# Patient Record
Sex: MALE | Race: OTHER
[De-identification: names, ages, dates, MRNs, and addresses within clinical notes are randomized per-mention and may not be internally consistent; named-entity substitution may affect disease eponyms.]

---

## 2020-01-06 ENCOUNTER — HOSPITAL ENCOUNTER (EMERGENCY)
Dept: HOSPITAL 11 - JP.ED | Age: 30
Discharge: HOME | End: 2020-01-06
Payer: SELF-PAY

## 2020-01-06 DIAGNOSIS — N13.2: Primary | ICD-10-CM

## 2020-01-06 DIAGNOSIS — F17.210: ICD-10-CM

## 2020-01-06 PROCEDURE — 80053 COMPREHEN METABOLIC PANEL: CPT

## 2020-01-06 PROCEDURE — 96375 TX/PRO/DX INJ NEW DRUG ADDON: CPT

## 2020-01-06 PROCEDURE — 36415 COLL VENOUS BLD VENIPUNCTURE: CPT

## 2020-01-06 PROCEDURE — 74177 CT ABD & PELVIS W/CONTRAST: CPT

## 2020-01-06 PROCEDURE — 83690 ASSAY OF LIPASE: CPT

## 2020-01-06 PROCEDURE — 83605 ASSAY OF LACTIC ACID: CPT

## 2020-01-06 PROCEDURE — 96374 THER/PROPH/DIAG INJ IV PUSH: CPT

## 2020-01-06 PROCEDURE — 96361 HYDRATE IV INFUSION ADD-ON: CPT

## 2020-01-06 PROCEDURE — 99284 EMERGENCY DEPT VISIT MOD MDM: CPT

## 2020-01-06 PROCEDURE — 85025 COMPLETE CBC W/AUTO DIFF WBC: CPT

## 2020-01-06 NOTE — EDM.PDOC
ED HPI GENERAL MEDICAL PROBLEM





- General


Chief Complaint: Abdominal Pain


Stated Complaint: RIGHT SIDE PAIN


Time Seen by Provider: 01/06/20 15:28


Source of Information: Reports: Patient, Family, RN Notes Reviewed, Other (

)


History Limitations: Reports: Language Barrier





- History of Present Illness


INITIAL COMMENTS - FREE TEXT/NARRATIVE: 





29-year-old gentleman presents emergency department a complaint of abdominal 

pain.  He is [place]-speaking only therefore we use the interpretive services.  

States the pain started today after urination it is predominantly in the right 

lower quadrant the pain is constant he does feel nauseated he is not passing 

gas denies any fevers shortness of breath or chest pain he does admit to 

abdominal surgery of a hernia repair on that side a couple years ago.





- Related Data


 Allergies











Allergy/AdvReac Type Severity Reaction Status Date / Time


 


No Known Allergies Allergy   Verified 01/06/20 15:08











Home Meds: 


 Home Meds





NK [No Known Home Meds]  01/06/20 [History]











Past Medical History





- Past Surgical History


GI Surgical History: Reports: Hernia Repair/Other





Social & Family History





- Tobacco Use


Smoking Status *Q: Light Tobacco Smoker


Years of Tobacco use: 5


Packs/Tins Daily: 0.2





- Recreational Drug Use


Recreational Drug Use: No





ED ROS GENERAL





- Review of Systems


Review Of Systems: See Below


Constitutional: Reports: No Symptoms


HEENT: Reports: No Symptoms


Respiratory: Reports: No Symptoms


Cardiovascular: Reports: No Symptoms


GI/Abdominal: Reports: Abdominal Pain, Nausea.  Denies: Flatus, Vomiting


: Reports: No Symptoms


Musculoskeletal: Reports: No Symptoms


Skin: Reports: No Symptoms


Neurological: Reports: No Symptoms





ED EXAM, GI/ABD





- Physical Exam


Exam: See Below


Exam Limited By: Language Barrier


General Appearance: Alert, WD/WN, No Apparent Distress


Head: Atraumatic, Normocephalic


Neck: Normal Inspection, Supple, Non-Tender, Full Range of Motion


Respiratory/Chest: No Respiratory Distress, Lungs Clear, Normal Breath Sounds, 

No Accessory Muscle Use, Chest Non-Tender


Cardiovascular: Regular Rate, Rhythm, No Murmur


GI/Abdominal Exam: Soft, Tender


Extremities: Normal Inspection, No Pedal Edema





Course





- Vital Signs


Last Recorded V/S: 


 Last Vital Signs











Temp  96.6 F   01/06/20 15:13


 


Pulse  86   01/06/20 15:13


 


Resp  18   01/06/20 15:13


 


BP  166/88 H  01/06/20 15:13


 


Pulse Ox  99   01/06/20 15:13














- Orders/Labs/Meds


Orders: 


 Active Orders 24 hr











 Category Date Time Status


 


 Peripheral IV Care [RC] .AS DIRECTED Care  01/06/20 15:29 Active


 


 UA W/MICROSCOPIC [URIN] Urgent Lab  01/06/20 15:29 Ordered


 


 Lactated Ringers [Ringers, Lactated] 1,000 ml Med  01/06/20 15:30 Active





 IV ASDIRECTED   


 


 Sodium Chloride 0.9% [Normal Saline] 85 ml Med  01/06/20 16:00 Active





 IV ASDIRECTED   


 


 Sodium Chloride 0.9% [Saline Flush] Med  01/06/20 15:29 Active





 10 ml FLUSH ASDIRECTED PRN   


 


 Peripheral IV Insertion Adult [OM.PC] Urgent Oth  01/06/20 15:29 Ordered








 Medication Orders





Lactated Ringer's (Ringers, Lactated)  1,000 mls @ 999 mls/hr IV ASDIRECTED ALEXI


   Last Admin: 01/06/20 15:48  Dose: 999 mls/hr


Sodium Chloride (Normal Saline)  85 mls @ 0 mls/hr IV ASDIRECTED ALEXI


   Last Admin: 01/06/20 16:12  Dose: 100 mls/hr


Sodium Chloride (Saline Flush)  10 ml FLUSH ASDIRECTED PRN


   PRN Reason: Keep Vein Open


   Last Admin: 01/06/20 15:41  Dose: 10 ml








Labs: 


 Laboratory Tests











  01/06/20 01/06/20 01/06/20 Range/Units





  15:35 15:35 15:35 


 


WBC  11.1 H    (4.5-11.0)  K/uL


 


RBC  5.34    (4.30-5.90)  M/uL


 


Hgb  14.2    (12.0-15.0)  g/dL


 


Hct  45.4    (40.0-54.0)  %


 


MCV  85    (80-98)  fL


 


MCH  27    (27-31)  pg


 


MCHC  31 L    (32-36)  %


 


Plt Count  302    (150-400)  K/uL


 


Neut % (Auto)  59    (36-66)  %


 


Lymph % (Auto)  29    (24-44)  %


 


Mono % (Auto)  7 H    (2-6)  %


 


Eos % (Auto)  4    (2-4)  %


 


Baso % (Auto)  1    (0-1)  %


 


Sodium   140   (140-148)  mmol/L


 


Potassium   3.9   (3.6-5.2)  mmol/L


 


Chloride   105   (100-108)  mmol/L


 


Carbon Dioxide   28   (21-32)  mmol/L


 


Anion Gap   6.9   (5.0-14.0)  mmol/L


 


BUN   10   (7-18)  mg/dL


 


Creatinine   1.0   (0.8-1.3)  mg/dL


 


Est Cr Clr Drug Dosing   111.53   mL/min


 


Estimated GFR (MDRD)   > 60   (>60)  


 


Glucose   106   ()  mg/dL


 


Lactic Acid    1.2  (0.4-2.0)  mmol/L


 


Calcium   8.8   (8.5-10.1)  mg/dL


 


Total Bilirubin   0.3   (0.2-1.0)  mg/dL


 


AST   27   (15-37)  U/L


 


ALT   64   (12-78)  U/L


 


Alkaline Phosphatase   92   ()  U/L


 


Total Protein   7.8   (6.4-8.2)  g/dL


 


Albumin   4.2   (3.4-5.0)  g/dL


 


Globulin   3.6 H   (2.3-3.5)  g/dL


 


Albumin/Globulin Ratio   1.2   (1.2-2.2)  


 


Lipase   67 L   ()  U/L











Meds: 


Medications











Generic Name Dose Route Start Last Admin





  Trade Name Freq  PRN Reason Stop Dose Admin


 


Lactated Ringer's  1,000 mls @ 999 mls/hr  01/06/20 15:30  01/06/20 15:48





  Ringers, Lactated  IV   999 mls/hr





  ASDIRECTED ALEXI   Administration





     





     





     





     


 


Sodium Chloride  85 mls @ 0 mls/hr  01/06/20 16:00  01/06/20 16:12





  Normal Saline  IV   100 mls/hr





  ASDIRECTED ALEXI   Administration





     





     





     





  KVO   


 


Sodium Chloride  10 ml  01/06/20 15:29  01/06/20 15:41





  Saline Flush  FLUSH   10 ml





  ASDIRECTED PRN   Administration





  Keep Vein Open   





     





     





     














Discontinued Medications














Generic Name Dose Route Start Last Admin





  Trade Name Freq  PRN Reason Stop Dose Admin


 


Fentanyl  50 mcg  01/06/20 15:29  01/06/20 15:41





  Sublimaze  IVPUSH  01/06/20 15:30  50 mcg





  ONETIME ONE   Administration





     





     





     





     


 


Iopamidol  150 ml  01/06/20 16:00  01/06/20 16:12





  Isovue-300 (61%)  IV   150 ml





  .AS DIRECTED ALEXI   Administration





     





     





     





     


 


Ketorolac Tromethamine  30 mg  01/06/20 16:18  01/06/20 16:31





  Toradol  IVPUSH  01/06/20 16:19  30 mg





  ONETIME ONE   Administration





     





     





     





     


 


Ondansetron HCl  4 mg  01/06/20 15:29  01/06/20 15:41





  Zofran  IVPUSH  01/06/20 15:30  4 mg





  ONETIME ONE   Administration





     





     





     





     


 


Sodium Chloride  10 ml  01/06/20 15:46  01/06/20 16:12





  Saline Flush  FLUSH  01/06/20 15:47  10 ml





  ONETIME ONE   Administration





     





     





     





     














Departure





- Departure


Time of Disposition: 16:53


Disposition: Home, Self-Care 01


Condition: Fair


Clinical Impression: 


 Nephrolithiasis








- Discharge Information


Instructions:  Kidney Stones, Easy-to-Read


Referrals: 


PCP,None [Primary Care Provider] - 


Forms:  ED Department Discharge


Additional Instructions: 


Use the ketorolac as needed for pain control, continue to push fluids,  please 

followup with your primary care provider in 3-5 days if not better, please call 

return to the emergency department with worsening of symptoms.





Sepsis Event Note





- Evaluation


Sepsis Screening Result: No Definite Risk





- Focused Exam


Vital Signs: 


 Vital Signs











  Temp Pulse Resp BP Pulse Ox


 


 01/06/20 15:13  96.6 F  86  18  166/88 H  99


 


 01/06/20 15:07  96.6 F  86  18  166/88 H  99











Date Exam was Performed: 01/06/20


Time Exam was Performed: 16:48





- My Orders


Last 24 Hours: 


My Active Orders





01/06/20 15:29


Peripheral IV Care [RC] .AS DIRECTED 


UA W/MICROSCOPIC [URIN] Urgent 


Sodium Chloride 0.9% [Saline Flush]   10 ml FLUSH ASDIRECTED PRN 


Peripheral IV Insertion Adult [OM.PC] Urgent 





01/06/20 15:30


Lactated Ringers [Ringers, Lactated] 1,000 ml IV ASDIRECTED 





01/06/20 16:00


Sodium Chloride 0.9% [Normal Saline] 85 ml IV ASDIRECTED 














- Assessment/Plan


Last 24 Hours: 


My Active Orders





01/06/20 15:29


Peripheral IV Care [RC] .AS DIRECTED 


UA W/MICROSCOPIC [URIN] Urgent 


Sodium Chloride 0.9% [Saline Flush]   10 ml FLUSH ASDIRECTED PRN 


Peripheral IV Insertion Adult [OM.PC] Urgent 





01/06/20 15:30


Lactated Ringers [Ringers, Lactated] 1,000 ml IV ASDIRECTED 





01/06/20 16:00


Sodium Chloride 0.9% [Normal Saline] 85 ml IV ASDIRECTED 











Plan: 





Assessment





Acuity = acute





Site and laterality = right-sided hydronephrosis, 4 mm stone appreciated in the 

bladder





Etiology  = nephrolithiasis





Manifestations = pain now improved





Location of injury =  Home





Lab values = CBC, CMP unremarkable urinalysis pending CT scan describes note 

above





Plan


Discharge home with ketorolac 10 mg p.o. 3 times daily PRN total #20 and follow-

up with primary care in 3 to 5 days if no improvement

















 This note was dictated using dragon voice recognition software please call 

with any questions on syntax or grammar.

## 2020-01-06 NOTE — CRLCT
Indication:



Lower quadrant abdominal pain.



Technique:



Multiple contiguous axial images were obtained from the lung bases through 

the symphysis pubis after the intravenous administration of 150 milliliters 

nonionic contrast. 



Please note that all CT scans at this facility use dose modulation, 

iterative reconstruction, and/or weight-based dosing when appropriate to 

reduce radiation dose to as low as reasonably achievable. 



Comparison:



None



Findings:



The lung bases are clear. The heart is normal in size. No pericardial 

effusion is identified.



Mild diffuse fatty infiltration of the liver is identified. No intrahepatic 

biliary ductal dilatation is identified. The gallbladder, spleen, pancreas, 

adrenals, and left kidney are normal. Right-sided hydronephrosis and 

hydroureter identified. 



In the pelvis, within the urinary bladder, to the right of midline, 3 

millimeter calculus is identified. This is consistent with a recently 

passed right ureteral stone. The prostate gland is normal. 



The small and large bowel are normal in caliber. The appendix is normal. No 

free air or free fluid is identified within the abdomen or pelvis. The 

aorta is normal in caliber. No lytic or blastic lesions of the spine are 

identified. 



Impression:



Right-sided hydronephrosis and hydroureter. A 3-4 mm stone is identified 

within the bladder just to the right of midline, consistent with a recently 

passed stone.



Mild diffuse fatty infiltration of the liver



Please note that all CT scans at this facility use dose modulation, 

iterative reconstruction, and/or weight-based dosing when appropriate to 

reduce radiation dose to as low as reasonably achievable.



Dictated by Hetal Elder MD @ Jan 6 2020  4:17PM



Signed by Dr. Hetal Elder @ Jan 6 2020  4:19PM